# Patient Record
Sex: FEMALE | Race: BLACK OR AFRICAN AMERICAN | NOT HISPANIC OR LATINO | URBAN - NONMETROPOLITAN AREA
[De-identification: names, ages, dates, MRNs, and addresses within clinical notes are randomized per-mention and may not be internally consistent; named-entity substitution may affect disease eponyms.]

---

## 2023-11-08 ENCOUNTER — OFFICE (OUTPATIENT)
Dept: URBAN - NONMETROPOLITAN AREA CLINIC 1 | Facility: CLINIC | Age: 68
End: 2023-11-08
Payer: MEDICAID

## 2023-11-08 VITALS
HEART RATE: 65 BPM | SYSTOLIC BLOOD PRESSURE: 139 MMHG | DIASTOLIC BLOOD PRESSURE: 92 MMHG | HEIGHT: 66 IN | WEIGHT: 103 LBS

## 2023-11-08 DIAGNOSIS — F17.210 NICOTINE DEPENDENCE, CIGARETTES, UNCOMPLICATED: ICD-10-CM

## 2023-11-08 DIAGNOSIS — D50.9 IRON DEFICIENCY ANEMIA, UNSPECIFIED: ICD-10-CM

## 2023-11-08 PROCEDURE — 99203 OFFICE O/P NEW LOW 30 MIN: CPT | Performed by: NURSE PRACTITIONER

## 2023-11-08 NOTE — SERVICEHPINOTES
Patient presents to the clinic today for colonoscopy consult.  She has never had a colonoscopy in the past.  Denies any family history of colon cancer.  She has a history of AKIRA and takes iron tablets daily which makes her stool dark.  Denies bloody bowel movements, n/v/d, abdominal pain, unintentional weight loss, or change in bowel movement.  She has a good appetite.  Admits to smoking cigarettes for over 40 years, less than 1/2pack per day.  Denies any medical conditions other than AKIRA.  Denies any cardiac stents or anticoagulation therapy.

## 2023-11-08 NOTE — SERVICENOTES
Risks of procedure explained to patient and she wishes to proceed.
Bowel prep prescribed and instructions given to patient.
AKIRA- continue iron tablet po daily.
Smoking cessation encouraged.

## 2024-02-21 ENCOUNTER — ON CAMPUS - OUTPATIENT (OUTPATIENT)
Dept: URBAN - NONMETROPOLITAN AREA HOSPITAL 34 | Facility: HOSPITAL | Age: 69
End: 2024-02-21
Payer: MEDICAID

## 2024-02-21 DIAGNOSIS — D12.0 BENIGN NEOPLASM OF CECUM: ICD-10-CM

## 2024-02-21 DIAGNOSIS — Z12.11 ENCOUNTER FOR SCREENING FOR MALIGNANT NEOPLASM OF COLON: ICD-10-CM

## 2024-02-21 DIAGNOSIS — K31.89 OTHER DISEASES OF STOMACH AND DUODENUM: ICD-10-CM

## 2024-02-21 PROCEDURE — 45385 COLONOSCOPY W/LESION REMOVAL: CPT | Mod: PT | Performed by: INTERNAL MEDICINE

## 2024-02-21 PROCEDURE — 43239 EGD BIOPSY SINGLE/MULTIPLE: CPT | Mod: 51 | Performed by: INTERNAL MEDICINE
